# Patient Record
Sex: MALE | Race: NATIVE HAWAIIAN OR OTHER PACIFIC ISLANDER
[De-identification: names, ages, dates, MRNs, and addresses within clinical notes are randomized per-mention and may not be internally consistent; named-entity substitution may affect disease eponyms.]

---

## 2019-02-22 ENCOUNTER — HOSPITAL ENCOUNTER (EMERGENCY)
Dept: HOSPITAL 5 - ED | Age: 79
Discharge: HOME | End: 2019-02-22
Payer: MEDICARE

## 2019-02-22 VITALS — DIASTOLIC BLOOD PRESSURE: 62 MMHG | SYSTOLIC BLOOD PRESSURE: 135 MMHG

## 2019-02-22 DIAGNOSIS — I10: ICD-10-CM

## 2019-02-22 DIAGNOSIS — J40: ICD-10-CM

## 2019-02-22 DIAGNOSIS — J06.9: Primary | ICD-10-CM

## 2019-02-22 LAB
BUN SERPL-MCNC: 13 MG/DL (ref 9–20)
BUN/CREAT SERPL: 12 %
CALCIUM SERPL-MCNC: 8.3 MG/DL (ref 8.4–10.2)
HCT VFR BLD CALC: 39.8 % (ref 35.5–45.6)
HEMOLYSIS INDEX: 8
HGB BLD-MCNC: 13.2 GM/DL (ref 11.8–15.2)
MCHC RBC AUTO-ENTMCNC: 33 % (ref 32–34)
MCV RBC AUTO: 83 FL (ref 84–94)
PLATELET # BLD: 195 K/MM3 (ref 140–440)
RBC # BLD AUTO: 4.78 M/MM3 (ref 3.65–5.03)

## 2019-02-22 PROCEDURE — 85027 COMPLETE CBC AUTOMATED: CPT

## 2019-02-22 PROCEDURE — 71046 X-RAY EXAM CHEST 2 VIEWS: CPT

## 2019-02-22 PROCEDURE — 80048 BASIC METABOLIC PNL TOTAL CA: CPT

## 2019-02-22 PROCEDURE — 94640 AIRWAY INHALATION TREATMENT: CPT

## 2019-02-22 PROCEDURE — 99284 EMERGENCY DEPT VISIT MOD MDM: CPT

## 2019-02-22 PROCEDURE — 36415 COLL VENOUS BLD VENIPUNCTURE: CPT

## 2019-02-22 NOTE — XRAY REPORT
FINAL REPORT



PROCEDURE:  Chest. 



TECHNIQUE:  PA and lateral views.



HISTORY:  Cough. 



COMPARISON:  No prior studies are available for comparison.



FINDINGS:  

The heart size is normal. There is calcification in the aortic arch. The lungs are clear and well exp
anded. There are no pleural effusions. The soft tissues and regional skeleton are unremarkable.



IMPRESSION:  

No evidence of acute disease.

## 2019-02-22 NOTE — EMERGENCY DEPARTMENT REPORT
Chief Complaint: Upper Respiratory Infection


Stated Complaint: COUGHING


Time Seen by Provider: 02/22/19 15:45





- HPI


History of Present Illness: 





24 HOUR HX OF COUGH


AMBULATORY


Greenlandic SPEAKING








wheezing in triage





To Ed for treatment. 





MSE completed


MSE screening note: 


Focused history and physical exam performed.


Due to findings the following was ordered:











ED Medical Decision Making





- Lab Data


Result diagrams: 


                                 02/22/19 15:57





                                 02/22/19 15:57





ED Disposition for MSE


Condition: Stable

## 2019-02-22 NOTE — EMERGENCY DEPARTMENT REPORT
HPI





- General


Chief Complaint: Upper Respiratory Infection


Time Seen by Provider: 02/22/19 15:45





- HPI


HPI: 





78-year-old  male presents to the emergency Department with a 24-hour of

a mixed dry and productive cough he also complains of some wheezing.  He denies 

any chest pain, fever, back pain, nausea, vomiting or diaphoresis.  He denies 

any past medical history.  He denies any tobacco or illicit drug use or abuse.  

He has not taken anything for her symptoms prior to presentation.  No recent 

travel or sick contacts at home.





ED Past Medical Hx





- Past Medical History


Previous Medical History?: No





- Surgical History


Past Surgical History?: Yes


Additional Surgical History: testicle surgery





- Social History


Smoking Status: Never Smoker


Substance Use Type: None





- Medications


Home Medications: 


                                Home Medications











 Medication  Instructions  Recorded  Confirmed  Last Taken  Type


 


Acetaminophen/Codeine [Tylenol #3] 1 tab PO Q6H PRN #20 tab 04/15/16  Unknown Rx


 


Cyclobenzaprine [Flexeril] 10 mg PO TID PRN #15 tablet 04/15/16  Unknown Rx


 


Ibuprofen [Motrin] 600 mg PO Q8H PRN #40 tablet 04/15/16  Unknown Rx


 


ALBUTEROL Inhaler (OR & NICU) 2 puff IH QID PRN #1 inhalation 02/22/19  Unknown 

Rx





[ProAir HFA Inhaler]     


 


Azithromycin [Zithromax Z-OCTAVIA] 250 mg PO DAILY #6 tab 02/22/19  Unknown Rx


 


Prednisone [predniSONE 5 mg (6-Day 5 mg PO .TAPER #1 tab.ds.pk 02/22/19  Unknown

 Rx





Pack, 21 Tabs)]     


 


guaiFENesin/CODEINE [Robitussin AC] 5 ml PO Q6H PRN #100 ml 02/22/19  Unknown Rx














ED Review of Systems


ROS: 


Stated complaint: COUGHING


Other details as noted in HPI





Comment: All other systems reviewed and negative


Constitutional: denies: chills, fever


Eyes: denies: eye pain, eye discharge


ENT: denies: ear pain, throat pain


Respiratory: cough, wheezing


Cardiovascular: denies: chest pain, edema


Gastrointestinal: denies: abdominal pain, vomiting


Genitourinary: denies: dysuria, discharge


Musculoskeletal: denies: back pain, arthralgia


Skin: denies: rash, lesions


Neurological: denies: headache, weakness





Physical Exam





- Physical Exam


Vital Signs: 


                                   Vital Signs











  02/22/19





  15:47


 


Temperature 98.9 F


 


Pulse Rate 95 H


 


Respiratory 14





Rate 


 


Blood Pressure 161/80





[Right] 


 


O2 Sat by Pulse 98





Oximetry 











Physical Exam: 





GENERAL: The patient is well-developed well-nourished.


HEENT: Normocephalic.  Atraumatic.   Patient has moist mucous membranes.


EYES:  Extraocular motions are intact.  Pupils are equal and reactive to light 

bilaterally.


NECK: Supple.  Trachea is midline.


CHEST/LUNGS: Clear to auscultation. Patient has a bronchospastic cough that 

occurs with coughing fits. There is no respiratory distress noted.  


HEART/CARDIOVASCULAR: Regular.  There is no tachycardia.  There is no obvious 

murmur.


ABDOMEN: Abdomen is soft, nontender.  Patient has normal bowel sounds.  There is

 no abdominal distention.


SKIN: Skin is warm and dry.


NEURO: The patient is awake, alert, and oriented.  The patient is cooperative.  

The patient has no focal neurologic deficits.  The patient has normal speech.


MUSCULOSKELETAL: There is no tenderness or deformity.  There is no evidence of 

acute injury.  





ED Course


                                   Vital Signs











  02/22/19





  15:47


 


Temperature 98.9 F


 


Pulse Rate 95 H


 


Respiratory 14





Rate 


 


Blood Pressure 161/80





[Right] 


 


O2 Sat by Pulse 98





Oximetry 














ED Medical Decision Making





- Lab Data


Result diagrams: 


                                 02/22/19 15:57





                                 02/22/19 15:57





- Radiology Data


Radiology results: image reviewed


interpreted by me: 





Chest x-ray does not show any pneumothorax, pleural effusion, pneumonia or 

obvious focal consolidation.





- Medical Decision Making





Patient presents with a 24-hour history of a cough.  On examination lungs sound 

clear patient has a bronchospastic cough that occurs with coughing fits.  Chest 

x-ray did not show any pleural effusions, pneumonia, focal consolidation, 

pneumothorax, or any other acute process.  Labs were unremarkable including a 

CBC and BMP.  He was given some Tessalon Perles for cough, steroids and a 

breathing treatment.  Upon reevaluation he is feeling improved.  Vital signs 

stable throughout his ED course.  Patient be discharged home with some 

antibiotics, albuterol inhaler, steroids and Robitussin-AC for cough.  He is 

instructed to follow-up with his primary care physician and return to the ER 

with any worsening of his symptoms or any acute distress.





- Differential Diagnosis


asthma, bronchitis, URI, pneumonia


Critical Care Time: No


Critical care attestation.: 


If time is entered above; I have spent that time in minutes in the direct care 

of this critically ill patient, excluding procedure time.








ED Disposition


Clinical Impression: 


 Viral URI, Bronchitis, Elevated blood pressure reading





Disposition: DC-01 TO HOME OR SELFCARE


Is pt being admited?: No


Condition: Stable


Instructions:  Upper Respiratory Infection (ED), Acute Bronchitis (ED)


Additional Instructions: 


Please follow-up with your primary care physician.  Return to the emergency 

Department with any worsening of your symptoms or any acute distress.


You have been prescribed a medication that can be sedating.  Therefore, this 

medication cannot be taken prior to driving, working, being responsible for 

children, and cannot be mixed with alcohol of any quantity.


Prescriptions: 


ALBUTEROL Inhaler (OR & NICU) [ProAir HFA Inhaler] 2 puff IH QID PRN #1 

inhalation


 PRN Reason: Shortness Of Breath


Azithromycin [Zithromax Z-OCTAVIA] 250 mg PO DAILY #6 tab


guaiFENesin/CODEINE [Robitussin AC] 5 ml PO Q6H PRN #100 ml


 PRN Reason: Cough


Prednisone [predniSONE 5 mg (6-Day Pack, 21 Tabs)] 5 mg PO .TAPER #1 tab.ds.pk


Referrals: 


PRIMARY CARE,MD [Primary Care Provider] - 2-3 Days


Forms:  Work/School Release Form(ED)


Time of Disposition: 18:28


Print Language: Pashto

## 2021-10-19 ENCOUNTER — HOSPITAL ENCOUNTER (EMERGENCY)
Dept: HOSPITAL 5 - ED | Age: 81
Discharge: HOME | End: 2021-10-19
Payer: MEDICARE

## 2021-10-19 VITALS — DIASTOLIC BLOOD PRESSURE: 70 MMHG | SYSTOLIC BLOOD PRESSURE: 146 MMHG

## 2021-10-19 DIAGNOSIS — S91.205A: Primary | ICD-10-CM
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
## 2021-10-19 DIAGNOSIS — S99.922A: ICD-10-CM

## 2021-10-19 PROCEDURE — 96372 THER/PROPH/DIAG INJ SC/IM: CPT

## 2021-10-19 PROCEDURE — 99283 EMERGENCY DEPT VISIT LOW MDM: CPT

## 2021-10-19 PROCEDURE — 11730 AVULSION NAIL PLATE SIMPLE 1: CPT

## 2021-10-19 PROCEDURE — 73630 X-RAY EXAM OF FOOT: CPT

## 2021-10-19 NOTE — EMERGENCY DEPARTMENT REPORT
ED Lower Extremity HPI





- General


Chief Complaint: Extremity Injury, Lower


Stated Complaint: FALL/LT FOOT TOE INJURY


Time Seen by Provider: 10/19/21 18:51


Source: patient, 


Mode of arrival: Ambulatory


Limitations: Language Barrier





- History of Present Illness


Initial Comments: 





Kiswahili interpretation by patient's family member with patient's permission








Patient is a 80-year-old male who presents emergency room after a left third toe

injury that occurred just prior to arrival.  Patient states that he was barefoot

and hit his foot against the corner of the couch.  He states initially there was

bleeding but has since improved.  He is complaining of pain.  He states his last

tetanus immunization was 1 year ago.  He denies any other injury. he denies any 

numbness or weakness. no allergies to medications.  No past medical history.





- Related Data


                                  Previous Rx's











 Medication  Instructions  Recorded  Last Taken  Type


 


Acetaminophen/Codeine [Tylenol #3] 1 tab PO Q6H PRN #20 tab 04/15/16 Unknown Rx


 


Cyclobenzaprine [Flexeril] 10 mg PO TID PRN #15 tablet 04/15/16 Unknown Rx


 


Ibuprofen [Motrin] 600 mg PO Q8H PRN #40 tablet 04/15/16 Unknown Rx


 


Albuterol Mdi (or & Nicu Only) 2 puff IH QID PRN #1 inhalation 02/22/19 Unknown 

Rx





[ProAir HFA Inhaler]    


 


Azithromycin [Zithromax Z-OCTAVIA] 250 mg PO DAILY #6 tab 02/22/19 Unknown Rx


 


Prednisone [predniSONE 5 mg (6-Day 5 mg PO .TAPER #1 tab.ds.pk 02/22/19 Unknown 

Rx





Pack, 21 Tabs)]    


 


guaiFENesin/CODEINE [Robitussin AC] 5 ml PO Q6H PRN #100 ml 02/22/19 Unknown Rx


 


Acetaminophen [Tylenol] 650 mg PO Q8HR PRN #20 capsule 10/19/21 Unknown Rx


 


Mupirocin [Bactroban 2% OINT] 1 applic TP TID #1 tube 10/19/21 Unknown Rx


 


cephALEXin [Keflex] 500 mg PO QID 7 Days #28 cap 10/19/21 Unknown Rx











                                    Allergies











Allergy/AdvReac Type Severity Reaction Status Date / Time


 


No Known Allergies Allergy   Verified 01/15/16 11:42














ED Review of Systems


ROS: 


Stated complaint: FALL/LT FOOT TOE INJURY


Other details as noted in HPI





Comment: All other systems reviewed and negative





ED Past Medical Hx





- Past Medical History


Previous Medical History?: No





- Surgical History


Additional Surgical History: testicle surgery SHOULDER





- Social History


Smoking Status: Never Smoker


Substance Use Type: None





- Medications


Home Medications: 


                                Home Medications











 Medication  Instructions  Recorded  Confirmed  Last Taken  Type


 


Acetaminophen/Codeine [Tylenol #3] 1 tab PO Q6H PRN #20 tab 04/15/16  Unknown Rx


 


Cyclobenzaprine [Flexeril] 10 mg PO TID PRN #15 tablet 04/15/16  Unknown Rx


 


Ibuprofen [Motrin] 600 mg PO Q8H PRN #40 tablet 04/15/16  Unknown Rx


 


Albuterol Mdi (or & Nicu Only) 2 puff IH QID PRN #1 inhalation 02/22/19  Unknown

 Rx





[ProAir HFA Inhaler]     


 


Azithromycin [Zithromax Z-OCTAVIA] 250 mg PO DAILY #6 tab 02/22/19  Unknown Rx


 


Prednisone [predniSONE 5 mg (6-Day 5 mg PO .TAPER #1 tab.ds.pk 02/22/19  Unknown

 Rx





Pack, 21 Tabs)]     


 


guaiFENesin/CODEINE [Robitussin AC] 5 ml PO Q6H PRN #100 ml 02/22/19  Unknown Rx


 


Acetaminophen [Tylenol] 650 mg PO Q8HR PRN #20 capsule 10/19/21  Unknown Rx


 


Mupirocin [Bactroban 2% OINT] 1 applic TP TID #1 tube 10/19/21  Unknown Rx


 


cephALEXin [Keflex] 500 mg PO QID 7 Days #28 cap 10/19/21  Unknown Rx














ED Physical Exam





- General


Limitations: Language Barrier


General appearance: alert, in no apparent distress





- Head


Head exam: Present: atraumatic, normocephalic





- Eye


Eye exam: Present: normal appearance





- ENT


ENT exam: Present: mucous membranes moist





- Extremities Exam


Extremities exam: Present: other (ttp to the distal left 3rd toe, nail avulsion 

of the third toe, small amount dried blood, no obvious laceration, FROM of the 

LLE, neurovascularly intact)





- Neurological Exam


Neurological exam: Present: alert, oriented X3





- Psychiatric


Psychiatric exam: Present: normal affect, normal mood





- Skin


Skin exam: Present: warm, dry





ED Course


                                   Vital Signs











  10/19/21 10/19/21





  18:49 21:17


 


Temperature 98.0 F 


 


Pulse Rate 85 81


 


Respiratory 18 17





Rate  


 


Blood Pressure 146/70 


 


O2 Sat by Pulse 98 99





Oximetry  














- Procedure Description


Procedures done: nail removal.  verbal consent obtained by patient using 

.  prepped with betadine, sterile drapes applied, 3 cc of 1% lidocaine

 without epi used for digital block of the left third toe, hemostats used to 

remove nail without complication, non adherent dressing applied, bleeding c

ontrolled, pt tolerated well, no complication





ED Lower Extremity MDM





- Radiology Data


Radiology results: report reviewed





Ordering Physician: RAGINI BOSCH  


Date of Service: 10/19/21  


Procedure(s): XR foot 3+V LT  


Accession Number(s): [id number]  


 


cc: RAGINI BOSCH   


 


Fluoro Time In Minutes:   


 


LEFT FOOT 3 VIEWS  


 


 INDICATION:  hit left 4th toe against corner of couch.   


 


 COMPARISON: None.  


 


 IMPRESSION:  On 1 view only there is suggestion of a very subtle nondisplaced 

fracture near the 


base of the fourth toe. The remaining bony structures are intact. Plantar spur 

is noted. No 


significant joint pathology.  


 


 Signer Name: Patric Pitts Jr, MD   


 Signed: 10/19/2021 7:40 PM  


 Workstation Name: [id number]   


 


 


Transcribed By: TTR  


Dictated By: PATRIC PITTS JR, MD  


Electronically Authenticated By: PATRIC PITTS JR, MD    


Signed Date/Time: 10/1940                                


 


 


 


DD/DT: 10/19/21 1938                                                            

  


TD/TT:





























- Medical Decision Making





Kiswahili interpretation by patient's family member with patient's permission








Patient is a 80-year-old male who presents emergency room after a left third toe

 injury that occurred just prior to arrival.  Patient states that he was bare

foot and hit his foot against the corner of the couch.  He states initially 

there was bleeding but has since improved.  He is complaining of pain.  He 

states his last tetanus immunization was 1 year ago.  He denies any other 

injury. he denies any numbness or weakness. no allergies to medications.  No 

past medical history.  Vitals are stable.  On exam:ttp to the distal left 3rd 

toe, nail avulsion of the third toe, small amount dried blood, no obvious 

laceration, FROM of the LLE, neurovascularly intact.  X-ray left foot: On 1 view

 only there is suggestion of a very subtle nondisplaced fracture near the base 

of the fourth toe. The remaining bony structures are intact. Plantar spur is 

noted. No significant joint pathology.  Nail removal per procedure note without 

any complications.  Dressing placed by nurse cathryn taping performed and patient 

given post op shoe by nurse and will be referred to orthopedic doctor.  Patient 

given prescription for medication.  Advised patient and patient's family member 

Please use medication as prescribed.  Follow-up with orthopedic doctor.  Please 

keep area clean, dry, covered.  Wash with antibacterial soap and water pat dry. 

 No hot tub or pool.  Return to emergency room for any new or worsening 

symptoms.


Critical care attestation.: 


If time is entered above; I have spent that time in minutes in the direct care 

of this critically ill patient, excluding procedure time.








ED Disposition


Clinical Impression: 


 Nail avulsion





Toe injury


Qualifiers:


 Encounter type: initial encounter Laterality: left Qualified Code(s): S99.922A 

- Unspecified injury of left foot, initial encounter





Disposition: 01 HOME / SELF CARE / HOMELESS


Is pt being admited?: No


Does the pt Need Aspirin: No


Condition: Stable


Instructions:  Nail Avulsion


Additional Instructions: 


Please use medication as prescribed.  Follow-up with orthopedic doctor.  Please 

keep area clean, dry, covered.  Wash with antibacterial soap and water pat dry. 

 No hot tub or pool.  Return to emergency room for any new or worsening 

symptoms.


Prescriptions: 


Mupirocin [Bactroban 2% OINT] 1 applic TP TID #1 tube


cephALEXin [Keflex] 500 mg PO QID 7 Days #28 cap


Acetaminophen [Tylenol] 650 mg PO Q8HR PRN #20 capsule


 PRN Reason: pain


Referrals: 


JASE YATES MD [Staff Physician] - 2-3 Days


[facility] [Provider Group] - 2-3 Days


Time of Disposition: 19:54


Print Language: Mongolian

## 2021-10-19 NOTE — XRAY REPORT
LEFT FOOT 3 VIEWS



INDICATION:  hit left 4th toe against corner of couch. 



COMPARISON: None.



IMPRESSION:  On 1 view only there is suggestion of a very subtle nondisplaced fracture near the base 
of the fourth toe. The remaining bony structures are intact. Plantar spur is noted. No significant jones
int pathology.



Signer Name: Patric Pitts Jr, MD 

Signed: 10/19/2021 7:40 PM

Workstation Name: [id number]

## 2024-12-17 ENCOUNTER — OFFICE VISIT (OUTPATIENT)
Dept: URBAN - METROPOLITAN AREA CLINIC 109 | Facility: CLINIC | Age: 84
End: 2024-12-17
Payer: COMMERCIAL

## 2024-12-17 ENCOUNTER — DASHBOARD ENCOUNTERS (OUTPATIENT)
Age: 84
End: 2024-12-17

## 2024-12-17 ENCOUNTER — LAB OUTSIDE AN ENCOUNTER (OUTPATIENT)
Dept: URBAN - METROPOLITAN AREA CLINIC 109 | Facility: CLINIC | Age: 84
End: 2024-12-17

## 2024-12-17 VITALS
HEIGHT: 66 IN | DIASTOLIC BLOOD PRESSURE: 64 MMHG | BODY MASS INDEX: 20.63 KG/M2 | HEART RATE: 77 BPM | TEMPERATURE: 98.1 F | WEIGHT: 128.4 LBS | SYSTOLIC BLOOD PRESSURE: 110 MMHG

## 2024-12-17 DIAGNOSIS — K62.89 RECTAL PAIN: ICD-10-CM

## 2024-12-17 DIAGNOSIS — R10.84 GENERALIZED ABDOMINAL PAIN: ICD-10-CM

## 2024-12-17 DIAGNOSIS — R19.4 CHANGE IN BOWEL HABIT: ICD-10-CM

## 2024-12-17 PROCEDURE — 99204 OFFICE O/P NEW MOD 45 MIN: CPT | Performed by: INTERNAL MEDICINE

## 2024-12-17 RX ORDER — ASPIRIN 81 MG/1
CHEW AND SWALLOW 1 TABLET BY MOUTH IN THE MORNING TABLET, CHEWABLE ORAL
Qty: 30 EACH | Refills: 4 | Status: ACTIVE | COMMUNITY

## 2024-12-17 RX ORDER — ATORVASTATIN CALCIUM 80 MG/1
TAKE 1 TABLET BY MOUTH NIGHTLY TABLET, FILM COATED ORAL
Qty: 90 EACH | Refills: 1 | Status: ACTIVE | COMMUNITY

## 2024-12-17 RX ORDER — EZETIMIBE 10 MG/1
TABLET ORAL
Qty: 30 TABLET | Status: ACTIVE | COMMUNITY

## 2024-12-17 RX ORDER — METOPROLOL TARTRATE 25 MG/1
TABLET, FILM COATED ORAL
Qty: 60 TABLET | Status: ACTIVE | COMMUNITY

## 2024-12-17 RX ORDER — CLOPIDOGREL BISULFATE 75 MG/1
TABLET, FILM COATED ORAL
Qty: 30 TABLET | Status: ACTIVE | COMMUNITY

## 2024-12-17 RX ORDER — OMEPRAZOLE 20 MG/1
TAKE 1 CAPSULE (20 MG) BY ORAL ROUTE ONCE DAILY BEFORE A MEAL FOR 30 DAYS CAPSULE, DELAYED RELEASE ORAL 1
Qty: 30 | Refills: 6 | Status: ACTIVE | COMMUNITY
Start: 2017-12-29

## 2024-12-17 RX ORDER — PANTOPRAZOLE 40 MG/1
TAKE 1 TABLET BY MOUTH IN THE MORNING TABLET, DELAYED RELEASE ORAL
Qty: 30 EACH | Refills: 4 | Status: ACTIVE | COMMUNITY

## 2024-12-17 NOTE — HPI-TODAY'S VISIT:
pt presents for GI evaluation. pt reports 2 months noted progressive rectal pain and itching with bm's. pt feels he is constipation though q1-2 days bm's. Denies rectal bleeding, abdominal pain or other LGI symptoms. No recent weight loss or anemia. No UGI symptoms including nausea, vomiting or other symptoms. pt concerned and would like futher evaluation.
no

## 2025-01-29 ENCOUNTER — OFFICE VISIT (OUTPATIENT)
Dept: URBAN - METROPOLITAN AREA SURGERY CENTER 23 | Facility: SURGERY CENTER | Age: 85
End: 2025-01-29

## 2025-01-31 ENCOUNTER — OFFICE VISIT (OUTPATIENT)
Dept: URBAN - METROPOLITAN AREA SURGERY CENTER 23 | Facility: SURGERY CENTER | Age: 85
End: 2025-01-31

## 2025-02-24 ENCOUNTER — TELEPHONE ENCOUNTER (OUTPATIENT)
Dept: URBAN - METROPOLITAN AREA CLINIC 109 | Facility: CLINIC | Age: 85
End: 2025-02-24

## 2025-03-03 ENCOUNTER — OFFICE VISIT (OUTPATIENT)
Dept: URBAN - METROPOLITAN AREA SURGERY CENTER 23 | Facility: SURGERY CENTER | Age: 85
End: 2025-03-03
Payer: COMMERCIAL

## 2025-03-03 DIAGNOSIS — K62.5 ANAL BLEEDING: ICD-10-CM

## 2025-03-03 DIAGNOSIS — Z86.0100 HISTORY OF COLON POLYPS: ICD-10-CM

## 2025-03-03 DIAGNOSIS — Z86.0100 PERSONAL HISTORY OF COLONIC POLYPS: ICD-10-CM

## 2025-03-03 DIAGNOSIS — Z12.11 COLON CANCER SCREENING: ICD-10-CM

## 2025-03-03 PROCEDURE — 45378 DIAGNOSTIC COLONOSCOPY: CPT | Performed by: INTERNAL MEDICINE

## 2025-03-03 PROCEDURE — 00812 ANES LWR INTST SCR COLSC: CPT | Performed by: NURSE ANESTHETIST, CERTIFIED REGISTERED

## 2025-03-03 RX ORDER — CLOPIDOGREL BISULFATE 75 MG/1
TABLET, FILM COATED ORAL
Qty: 30 TABLET | Status: ACTIVE | COMMUNITY

## 2025-03-03 RX ORDER — ASPIRIN 81 MG/1
CHEW AND SWALLOW 1 TABLET BY MOUTH IN THE MORNING TABLET, CHEWABLE ORAL
Qty: 30 EACH | Refills: 4 | Status: ACTIVE | COMMUNITY

## 2025-03-03 RX ORDER — EZETIMIBE 10 MG/1
TABLET ORAL
Qty: 30 TABLET | Status: ACTIVE | COMMUNITY

## 2025-03-03 RX ORDER — PANTOPRAZOLE 40 MG/1
TAKE 1 TABLET BY MOUTH IN THE MORNING TABLET, DELAYED RELEASE ORAL
Qty: 30 EACH | Refills: 4 | Status: ACTIVE | COMMUNITY

## 2025-03-03 RX ORDER — METOPROLOL TARTRATE 25 MG/1
TABLET, FILM COATED ORAL
Qty: 60 TABLET | Status: ACTIVE | COMMUNITY

## 2025-03-03 RX ORDER — ATORVASTATIN CALCIUM 80 MG/1
TAKE 1 TABLET BY MOUTH NIGHTLY TABLET, FILM COATED ORAL
Qty: 90 EACH | Refills: 1 | Status: ACTIVE | COMMUNITY

## 2025-03-03 RX ORDER — OMEPRAZOLE 20 MG/1
TAKE 1 CAPSULE (20 MG) BY ORAL ROUTE ONCE DAILY BEFORE A MEAL FOR 30 DAYS CAPSULE, DELAYED RELEASE ORAL 1
Qty: 30 | Refills: 6 | Status: ACTIVE | COMMUNITY
Start: 2017-12-29